# Patient Record
Sex: MALE | Race: WHITE
[De-identification: names, ages, dates, MRNs, and addresses within clinical notes are randomized per-mention and may not be internally consistent; named-entity substitution may affect disease eponyms.]

---

## 2018-11-23 PROBLEM — Z00.00 ENCOUNTER FOR PREVENTIVE HEALTH EXAMINATION: Status: ACTIVE | Noted: 2018-11-23

## 2019-01-18 ENCOUNTER — APPOINTMENT (OUTPATIENT)
Dept: GASTROENTEROLOGY | Facility: HOSPITAL | Age: 74
End: 2019-01-18

## 2019-02-06 ENCOUNTER — RECORD ABSTRACTING (OUTPATIENT)
Age: 74
End: 2019-02-06

## 2019-02-06 DIAGNOSIS — Z87.39 PERSONAL HISTORY OF OTHER DISEASES OF THE MUSCULOSKELETAL SYSTEM AND CONNECTIVE TISSUE: ICD-10-CM

## 2019-02-06 DIAGNOSIS — Z78.9 OTHER SPECIFIED HEALTH STATUS: ICD-10-CM

## 2019-02-06 RX ORDER — OMEPRAZOLE 40 MG/1
40 CAPSULE, DELAYED RELEASE ORAL
Refills: 0 | Status: ACTIVE | COMMUNITY

## 2019-02-06 RX ORDER — OMEPRAZOLE MAGNESIUM 20 MG/1
20 TABLET, DELAYED RELEASE ORAL DAILY
Refills: 0 | Status: ACTIVE | COMMUNITY

## 2019-03-12 ENCOUNTER — APPOINTMENT (OUTPATIENT)
Dept: GASTROENTEROLOGY | Facility: CLINIC | Age: 74
End: 2019-03-12
Payer: COMMERCIAL

## 2019-03-12 VITALS
BODY MASS INDEX: 27.28 KG/M2 | HEIGHT: 68 IN | DIASTOLIC BLOOD PRESSURE: 70 MMHG | SYSTOLIC BLOOD PRESSURE: 138 MMHG | WEIGHT: 180 LBS

## 2019-03-12 DIAGNOSIS — Z12.11 ENCOUNTER FOR SCREENING FOR MALIGNANT NEOPLASM OF COLON: ICD-10-CM

## 2019-03-12 PROCEDURE — 99214 OFFICE O/P EST MOD 30 MIN: CPT

## 2019-03-12 RX ORDER — HYDROXYCHLOROQUINE SULFATE 200 MG/1
200 TABLET ORAL DAILY
Refills: 0 | Status: DISCONTINUED | COMMUNITY
End: 2019-03-12

## 2019-03-15 PROBLEM — Z12.11 COLON CANCER SCREENING: Status: ACTIVE | Noted: 2019-03-15

## 2019-03-15 RX ORDER — ALENDRONATE SODIUM 70 MG/1
70 TABLET ORAL
Qty: 4 | Refills: 0 | Status: ACTIVE | COMMUNITY
Start: 2019-02-04

## 2019-03-15 RX ORDER — FOLIC ACID 1 MG/1
1 TABLET ORAL
Refills: 0 | Status: ACTIVE | COMMUNITY

## 2019-03-15 RX ORDER — METHOTREXATE 2.5 MG/1
2.5 TABLET ORAL
Refills: 0 | Status: ACTIVE | COMMUNITY

## 2019-03-15 RX ORDER — BENZOCAINE/BENZALKONIUM/ALOE/E 5 %-0.13 %
1500 CREAM (GRAM) TOPICAL
Refills: 0 | Status: ACTIVE | COMMUNITY

## 2019-03-15 RX ORDER — PREDNISONE 10 MG/1
10 TABLET ORAL
Refills: 0 | Status: ACTIVE | COMMUNITY

## 2019-03-15 NOTE — HISTORY OF PRESENT ILLNESS
[FreeTextEntry1] :  75 yo m with RA on methotrexate and prednisone here for follow up of gastritis. \par Patient initially seen at the request of Dr. Wade for the evaluation of GERD. \par        ~ 1 year ago patient started to experience chronic cough, reflux and hoarseness.\par       \par        He has a h/o of frequent NSAID use but stopped several months ago. \par EGD /Colonoscopy 10/26/18 for GERD/Screening\par -erosive gastritis with evidence of bleeding, small HH, subcentimeter nonadenomatous polyps, hemorrhoids\par path - negative HP, erosive gastritis, HP negative, reflux changes no IM, , no EoE, \par \par Today, he feels very well on PPI once a day. \par Reflux resolved. \par     Patient cough improved with alkaline water/diet. \par    Hoarseness and it is improving with voice therapy. \par He started prednisone and mtx in Jan 2019 for RA. \par \par He has one alcoholic drink per day\par no brbpr/melena. occasional constipation. \par no hb/reflux/abd pain. \par weight is stable.

## 2019-03-15 NOTE — HISTORY OF PRESENT ILLNESS
[FreeTextEntry1] :  73 yo m with RA on methotrexate and prednisone here for follow up of gastritis. \par Patient initially seen at the request of Dr. Wade for the evaluation of GERD. \par        ~ 1 year ago patient started to experience chronic cough, reflux and hoarseness.\par       \par        He has a h/o of frequent NSAID use but stopped several months ago. \par EGD /Colonoscopy 10/26/18 for GERD/Screening\par -erosive gastritis with evidence of bleeding, small HH, subcentimeter nonadenomatous polyps, hemorrhoids\par path - negative HP, erosive gastritis, HP negative, reflux changes no IM, , no EoE, \par \par Today, he feels very well on PPI once a day. \par Reflux resolved. \par     Patient cough improved with alkaline water/diet. \par    Hoarseness and it is improving with voice therapy. \par He started prednisone and mtx in Jan 2019 for RA. \par \par He has one alcoholic drink per day\par no brbpr/melena. occasional constipation. \par no hb/reflux/abd pain. \par weight is stable.

## 2019-03-15 NOTE — PHYSICAL EXAM
[General Appearance - Alert] : alert [General Appearance - In No Acute Distress] : in no acute distress [Sclera] : the sclera and conjunctiva were normal [Outer Ear] : the ears and nose were normal in appearance [Hearing Threshold Finger Rub Not [place]] : hearing was normal [] : no respiratory distress [Apical Impulse] : the apical impulse was normal [Abdomen Soft] : soft [Abdomen Tenderness] : non-tender [FreeTextEntry1] : c [Abnormal Walk] : normal gait [Skin Color & Pigmentation] : normal skin color and pigmentation [No Focal Deficits] : no focal deficits [Oriented To Time, Place, And Person] : oriented to person, place, and time

## 2019-03-15 NOTE — PHYSICAL EXAM
[General Appearance - Alert] : alert [General Appearance - In No Acute Distress] : in no acute distress [Sclera] : the sclera and conjunctiva were normal [Outer Ear] : the ears and nose were normal in appearance [Hearing Threshold Finger Rub Not [place]] : hearing was normal [] : no respiratory distress [Apical Impulse] : the apical impulse was normal [Abdomen Soft] : soft [Abdomen Tenderness] : non-tender [FreeTextEntry1] : c [Abnormal Walk] : normal gait [Skin Color & Pigmentation] : normal skin color and pigmentation [No Focal Deficits] : no focal deficits [Oriented To Time, Place, And Person] : oriented to person, place, and time

## 2019-03-15 NOTE — ASSESSMENT
[FreeTextEntry1] : Erosive gastritis likely due to previous NSAID use, EtOH. \par Patient now feeling well on daily PPI. \par Recommend continue to avoid NSAIDS, alcohol cessation and continue PPI as long as he is on Prednisone. When off of Prednisone he may stop PPI, if recurrent symptoms he should call. Continue anti-reflux diet/alkaline water. \par \par nonadenomatous polyps on last colonoscopy, repeat colonoscopy in clinically indicated. \par

## 2020-04-22 ENCOUNTER — APPOINTMENT (OUTPATIENT)
Dept: GASTROENTEROLOGY | Facility: CLINIC | Age: 75
End: 2020-04-22
Payer: COMMERCIAL

## 2020-04-22 DIAGNOSIS — K21.9 GASTRO-ESOPHAGEAL REFLUX DISEASE W/OUT ESOPHAGITIS: ICD-10-CM

## 2020-04-22 DIAGNOSIS — K29.60 OTHER GASTRITIS W/OUT BLEEDING: ICD-10-CM

## 2020-04-22 PROCEDURE — 99213 OFFICE O/P EST LOW 20 MIN: CPT | Mod: 95

## 2020-04-22 RX ORDER — OMEPRAZOLE 40 MG/1
40 CAPSULE, DELAYED RELEASE ORAL
Qty: 30 | Refills: 2 | Status: ACTIVE | COMMUNITY
Start: 2020-04-22 | End: 1900-01-01

## 2020-04-22 NOTE — PHYSICAL EXAM
[General Appearance - Alert] : alert [General Appearance - In No Acute Distress] : in no acute distress [General Appearance - Well Developed] : well developed [General Appearance - Well Nourished] : well nourished [General Appearance - Well-Appearing] : healthy appearing [Outer Ear] : the ears and nose were normal in appearance [Hearing Threshold Finger Rub Not [place]] : hearing was normal [Neck Appearance] : the appearance of the neck was normal [] : no respiratory distress

## 2020-04-22 NOTE — ASSESSMENT
[FreeTextEntry1] : Exacerbation of GERD. recommend antireflux diet/lifestyle. Start omeprazole 40 mg daily prior to breakfast. If improvement, continue for 3 months, then decrease dose to 20 mg daily and take as long as requiring daily Prednisone for gastric mucosal protection due to h/o erosive gastritis. Continue H2RA. \par \par Erosive gastritis- likely due to steroids/EtOH- continue daily PPI\par \par patient instructed to call back if no improvement.

## 2020-04-22 NOTE — HISTORY OF PRESENT ILLNESS
[Home] : at home, [unfilled] , at the time of the visit. [Medical Office: ([facility])___] : at the medical office located in  [Patient] : the patient [FreeTextEntry1] : 74 yo m with RA on Enbrel/Methotrexate/Prednisone, h/o GERD/erosive gastritis presents for evaluation for GERD. \par Patient had URI in 12/2019 associated with a cough. he saw PMD who prescribed abx and cough suppressants. Cough persisted and he saw Dr. Wade in March who started Elavil which resolved cough immediately. Patient had significant side effects from Elavil so he is now weaning off of the medication. Last week he developed significant heartburn and burping. It wakes him up in the morning. He spoke with Dr. Wade who started him on omeprazole 20 mg daily, he has been taking this for a week and it has not been helpful. Gaviscon has also not been helpful. \par He has been taking famotidine BID for approximately 1 year. \par He has a good appetite, no n/v. no dysphagia/odynophagia. no abd pain. no change in bowel habits. no brbpr/melena. no weight loss. \par He drinks 1-2 alcoholic drinks per day. \par He denies NSAID/baby asa use. \par He takes Prednisone 5 mg per day. \par No significant diet changes. He admits to eating late at night. \par \par EGD/Colonoscopy in 10/26/18 for GERD/screening- erosive gastritis with evidence of bleeding, small HH, subcentimeter nonadenomatous polyps, hemorrhoids\par Path- HP negative erosive gastritis, reflux changes, no IM, no EoE [Self] : self